# Patient Record
Sex: MALE | Race: WHITE | Employment: UNEMPLOYED | URBAN - METROPOLITAN AREA
[De-identification: names, ages, dates, MRNs, and addresses within clinical notes are randomized per-mention and may not be internally consistent; named-entity substitution may affect disease eponyms.]

---

## 2020-06-11 ENCOUNTER — VIRTUAL VISIT (OUTPATIENT)
Dept: FAMILY MEDICINE CLINIC | Age: 35
End: 2020-06-11

## 2020-06-11 DIAGNOSIS — G89.29 CHRONIC PAIN OF BOTH KNEES: Primary | ICD-10-CM

## 2020-06-11 DIAGNOSIS — M25.561 CHRONIC PAIN OF BOTH KNEES: Primary | ICD-10-CM

## 2020-06-11 DIAGNOSIS — M25.562 CHRONIC PAIN OF BOTH KNEES: Primary | ICD-10-CM

## 2020-06-11 NOTE — PROGRESS NOTES
Sherita Ahuja is a 36 y.o. male who was seen by synchronous (real-time) audio-video technology on 6/11/2020. Consent: Sherita Ahuja, who was seen by synchronous (real-time) audio-video technology, and/or his healthcare decision maker, is aware that this patient-initiated, Telehealth encounter on 6/11/2020 is a billable service, with coverage as determined by his insurance carrier. He is aware that he may receive a bill and has provided verbal consent to proceed: Yes. Assessment & Plan:   Diagnoses and all orders for this visit:    1. Chronic pain of both knees  -     REFERRAL TO ORTHOPEDICS  -     METABOLIC PANEL, COMPREHENSIVE; Future  -     URIC ACID; Future  -     CBC WITH AUTOMATED DIFF; Future  -     RHEUMATOID FACTOR, QT; Future        I spent at least 15 minutes on this visit with this new patient. 712  Subjective:   Sherita Ahuja is a 36 y.o. male who was seen for Leg Pain (he has chronic leg and foot pain.  knee worse when walking up and down stairs. he has had sxs for weeks. no assoc weakness or numbness.  )      Prior to Admission medications    Not on File     Allergies not on file    There is no problem list on file for this patient. No past medical history on file. Review of Systems   Constitutional: Negative for chills and fever. Respiratory: Negative for cough. Cardiovascular: Negative for chest pain. Musculoskeletal: Positive for back pain and joint pain. Skin: Negative for rash. Neurological: Negative. Psychiatric/Behavioral: Negative. Objective: There were no vitals taken for this visit.    General: alert, cooperative, no distress   Mental  status: normal mood, behavior, speech, dress, motor activity, and thought processes, able to follow commands   HENT: NCAT   Neck: no visualized mass   Resp: no respiratory distress   Neuro: no gross deficits   Skin: no discoloration or lesions of concern on visible areas   Psychiatric: normal affect, consistent with stated mood, no evidence of hallucinations     Additional exam findings: We discussed the expected course, resolution and complications of the diagnosis(es) in detail. Medication risks, benefits, costs, interactions, and alternatives were discussed as indicated. I advised him to contact the office if his condition worsens, changes or fails to improve as anticipated. He expressed understanding with the diagnosis(es) and plan. Davin Reza is a 36 y.o. male who was evaluated by a video visit encounter for concerns as above. Patient identification was verified prior to start of the visit. A caregiver was present when appropriate. Due to this being a TeleHealth encounter (During [facility]-32 public health emergency), evaluation of the following organ systems was limited: Vitals/Constitutional/EENT/Resp/CV/GI//MS/Neuro/Skin/Heme-Lymph-Imm. Pursuant to the emergency declaration under the [facility], [facility] waiver authority and the [employer] Act, this Virtual  Visit was conducted, with patient's (and/or legal guardian's) consent, to reduce the patient's risk of exposure to COVID-19 and provide necessary medical care. Services were provided through a video synchronous discussion virtually via doxy. me to substitute for in-person clinic visit. Patient was at home and provider was at the office.       Javi Uriarte MD

## 2020-06-12 ENCOUNTER — HOSPITAL ENCOUNTER (OUTPATIENT)
Dept: LAB | Age: 35
Discharge: HOME | End: 2020-06-12
Payer: COMMERCIAL

## 2020-06-12 DIAGNOSIS — M25.562 CHRONIC PAIN OF BOTH KNEES: ICD-10-CM

## 2020-06-12 DIAGNOSIS — M25.561 CHRONIC PAIN OF BOTH KNEES: ICD-10-CM

## 2020-06-12 DIAGNOSIS — G89.29 CHRONIC PAIN OF BOTH KNEES: ICD-10-CM

## 2020-06-12 LAB
ALBUMIN SERPL-MCNC: 3.9 G/DL (ref 3.4–5)
ALBUMIN/GLOB SERPL: 1.1 {RATIO} (ref 0.8–1.7)
ALP SERPL-CCNC: 121 U/L (ref 45–117)
ALT SERPL-CCNC: 30 U/L (ref 16–61)
ANION GAP SERPL CALC-SCNC: 5 MMOL/L (ref 3–18)
AST SERPL-CCNC: 13 U/L (ref 10–38)
BASOPHILS # BLD: 0 K/UL (ref 0–0.1)
BASOPHILS NFR BLD: 0 % (ref 0–2)
BILIRUB SERPL-MCNC: 0.5 MG/DL (ref 0.2–1)
BUN SERPL-MCNC: 16 MG/DL (ref 7–18)
BUN/CREAT SERPL: 15 (ref 12–20)
CALCIUM SERPL-MCNC: 9.2 MG/DL (ref 8.5–10.1)
CHLORIDE SERPL-SCNC: 107 MMOL/L (ref 100–111)
CO2 SERPL-SCNC: 27 MMOL/L (ref 21–32)
CREAT SERPL-MCNC: 1.09 MG/DL (ref 0.6–1.3)
DIFFERENTIAL METHOD BLD: ABNORMAL
EOSINOPHIL # BLD: 0.1 K/UL (ref 0–0.4)
EOSINOPHIL NFR BLD: 2 % (ref 0–5)
ERYTHROCYTE [DISTWIDTH] IN BLOOD BY AUTOMATED COUNT: 11.9 % (ref 11.6–14.5)
GLOBULIN SER CALC-MCNC: 3.7 G/DL (ref 2–4)
GLUCOSE SERPL-MCNC: 78 MG/DL (ref 74–99)
HCT VFR BLD AUTO: 45 % (ref 36–48)
HGB BLD-MCNC: 15.7 G/DL (ref 13–16)
LYMPHOCYTES # BLD: 1.8 K/UL (ref 0.9–3.6)
LYMPHOCYTES NFR BLD: 30 % (ref 21–52)
MCH RBC QN AUTO: 30.5 PG (ref 24–34)
MCHC RBC AUTO-ENTMCNC: 34.9 G/DL (ref 31–37)
MCV RBC AUTO: 87.5 FL (ref 74–97)
MONOCYTES # BLD: 0.6 K/UL (ref 0.05–1.2)
MONOCYTES NFR BLD: 11 % (ref 3–10)
NEUTS SEG # BLD: 3.5 K/UL (ref 1.8–8)
NEUTS SEG NFR BLD: 57 % (ref 40–73)
PLATELET # BLD AUTO: 236 K/UL (ref 135–420)
PMV BLD AUTO: 11.6 FL (ref 9.2–11.8)
POTASSIUM SERPL-SCNC: 4.3 MMOL/L (ref 3.5–5.5)
PROT SERPL-MCNC: 7.6 G/DL (ref 6.4–8.2)
RBC # BLD AUTO: 5.14 M/UL (ref 4.7–5.5)
RHEUMATOID FACT SERPL-ACNC: <10 IU/ML
SODIUM SERPL-SCNC: 139 MMOL/L (ref 136–145)
URATE SERPL-MCNC: 4.2 MG/DL (ref 2.6–7.2)
WBC # BLD AUTO: 6.1 K/UL (ref 4.6–13.2)

## 2020-06-12 PROCEDURE — 36415 COLL VENOUS BLD VENIPUNCTURE: CPT

## 2020-06-12 PROCEDURE — 80053 COMPREHEN METABOLIC PANEL: CPT

## 2020-06-12 PROCEDURE — 86431 RHEUMATOID FACTOR QUANT: CPT

## 2020-06-12 PROCEDURE — 85025 COMPLETE CBC W/AUTO DIFF WBC: CPT

## 2020-06-12 PROCEDURE — 84550 ASSAY OF BLOOD/URIC ACID: CPT

## 2020-07-08 ENCOUNTER — OFFICE VISIT (OUTPATIENT)
Dept: ORTHOPEDIC SURGERY | Age: 35
End: 2020-07-08

## 2020-07-08 VITALS
DIASTOLIC BLOOD PRESSURE: 85 MMHG | SYSTOLIC BLOOD PRESSURE: 125 MMHG | WEIGHT: 185 LBS | HEART RATE: 86 BPM | HEIGHT: 67 IN | TEMPERATURE: 98.2 F | BODY MASS INDEX: 29.03 KG/M2 | OXYGEN SATURATION: 98 %

## 2020-07-08 DIAGNOSIS — M79.671 FOOT PAIN, RIGHT: ICD-10-CM

## 2020-07-08 DIAGNOSIS — M76.51 PATELLAR TENDONITIS OF BOTH KNEES: ICD-10-CM

## 2020-07-08 DIAGNOSIS — G89.29 CHRONIC PAIN OF RIGHT ANKLE: ICD-10-CM

## 2020-07-08 DIAGNOSIS — M22.42 CHONDROMALACIA OF LEFT PATELLA: Primary | ICD-10-CM

## 2020-07-08 DIAGNOSIS — M25.571 CHRONIC PAIN OF RIGHT ANKLE: ICD-10-CM

## 2020-07-08 DIAGNOSIS — G89.29 CHRONIC PAIN OF RIGHT KNEE: ICD-10-CM

## 2020-07-08 DIAGNOSIS — M76.52 PATELLAR TENDONITIS OF BOTH KNEES: ICD-10-CM

## 2020-07-08 DIAGNOSIS — M25.561 CHRONIC PAIN OF RIGHT KNEE: ICD-10-CM

## 2020-07-08 DIAGNOSIS — M25.562 CHRONIC PAIN OF LEFT KNEE: ICD-10-CM

## 2020-07-08 DIAGNOSIS — G89.29 CHRONIC PAIN OF LEFT KNEE: ICD-10-CM

## 2020-07-08 DIAGNOSIS — M22.41 CHONDROMALACIA OF RIGHT PATELLA: ICD-10-CM

## 2020-07-08 DIAGNOSIS — M54.50 LUMBAR PAIN: ICD-10-CM

## 2020-07-08 DIAGNOSIS — M76.30 ILIOTIBIAL BAND TENDONITIS, UNSPECIFIED LATERALITY: ICD-10-CM

## 2020-07-08 NOTE — PROGRESS NOTES
Patient: Rosa Barragan                MRN: [id number]       SSN: [id number]  YOB: 1985        AGE: 36 y.o. SEX: male    PCP: Susan Ford MD  07/08/20    Chief Complaint   Patient presents with    Ankle Pain     RIGHT    Knee Pain     BILAT     HISTORY:  Rosa Barragan is a 36 y.o. male who is seen for bilateral knee and right ankle pain. He has been experiencing bilateral knee pain  L>R for the past several months. He relates his knee pain to gait alteration--compensating for his right foot arthritis pain. He experiences pain over Gerdy's tubercle region both knees. He notes pain with standing, walking and stair climbing. He experiences startup pain after sitting. He is unable to kneel on hard surfaces. He is also seen for right foot pain. 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 He reports severe right ankle stiffness. He is also seen for back pain. He fell 2 years ago and has been experiencing back pain ever since. 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 He lives in [place] with his wife and 4 sons- 6, 6, 10 and 3 yo. He was medically disqualified from joining the [employer] years ago due to his right foot problem. He recently bought his wife knee pads because she broke both of her heels. Mr. Antonio Justin weighs 185 lbs and is 5'7\" tall.       No results found for: HBA1C, HGBE8, IGV3PXUI, MOW9NVGC, ECK1JNCB  Weight Metrics 7/8/2020   Weight 185 lb   BMI 28.98 kg/m2       There is no problem list on file for this patient. REVIEW OF SYSTEMS:    Constitutional Symptoms: Negative   Eyes: Negative   Ears, Nose, Throat and Mouth: Negative   Cardiovascular: Negative   Respiratory: Negative   Genitourinary: Per HPI   Gastrointestinal: Per HPI   Integumentary (Skin and/or Breast): Negative   Musculoskeletal: Per HPI   Endocrine/Rheumatologic: Negative   Neurological: Per HPI   Hematology/Lymphatic: Negative    Allergic/Immunologic: Negative   Phychiatric: Negative    Social History     Socioeconomic History    Marital status:      Spouse name: Not on file    Number of children: Not on file    Years of education: Not on file    Highest education level: Not on file   Occupational History    Not on file   Social Needs    Financial resource strain: Not on file    Food insecurity     Worry: Not on file     Inability: Not on file    Transportation needs     Medical: Not on file     Non-medical: Not on file   Tobacco Use    Smoking status: Light Tobacco Smoker    Smokeless tobacco: Never Used   Substance and Sexual Activity    Alcohol use: Not on file    Drug use: Not on file    Sexual activity: Not on file   Lifestyle    Physical activity     Days per week: Not on file     Minutes per session: Not on file    Stress: Not on file   Relationships    Social connections     Talks on phone: Not on file     Gets together: Not on file     Attends [employer] service: Not on file     Active member of club or organization: Not on file     Attends meetings of clubs or organizations: Not on file     Relationship status: Not on file    Intimate partner violence     Fear of current or ex partner: Not on file     Emotionally abused: Not on file     Physically abused: Not on file     Forced sexual activity: Not on file   Other Topics Concern    Not on file   Social History Narrative    Not on file      Allergies   Allergen Reactions    Amoxicillin Hives      No current outpatient medications on file.      No current facility-administered medications for this visit. PHYSICAL EXAMINATION:  Visit Vitals  /85   Pulse 86   Temp 98.2 °F (36.8 °C) (Temporal)   Ht 5' 7\" (1.702 m)   Wt 185 lb (83.9 kg)   SpO2 98%   BMI 28.98 kg/m²      ORTHO EXAMINATION:  Examination Right knee Left knee   Skin Intact Intact   Range of motion 120-0 120-0   Effusion - -   Medial joint line tenderness + +   Lateral joint line tenderness - -   Popliteal tenderness - -   Osteophytes palpable - -   Valentíns - -   Patella crepitus - -   Anterior drawer - -   Lateral laxity - -   Medial laxity - -   Varus deformity - -   Valgus deformity - -   Pretibial edema - -   Calf tenderness - -     Examination Right Ankle/Foot Left Ankle/Foot   Skin Intact, medial incision site Intact   Swelling - -   Dorsiflexion 0 10   Plantarflexion 20 45   Deformity - -   Inversion laxity - -   Anterior drawer - -   Medial tenderness - -   Lateral tenderness - -   Heel cord Intact Intact   Sensation Intact Intact   Bunion - -   Toe nails Normal Normal   Capillary refill Normal Normal    Walking with limp    RADIOGRAPHS:  XR BILAT KNEE 7/8/20 [facility]  IMPRESSION:  Three views - No fractures, no effusion, mild joint space narrowing, no osteophytes present. Kellgren Topher grade 1     XR LEFT ANKLE 7/8/20 [facility]  IMPRESSION:  Three views - No fractures, mild subtalar, tibiotalar osteoarthritis. IMPRESSION:      ICD-10-CM ICD-9-CM    1. Chondromalacia of left patella M22.42 717.7    2. Chronic pain of right knee M25.561 719.46 AMB POC X-RAY KNEE 3 VIEW    G89.29 338.29    3. Chronic pain of left knee M25.562 719.46 AMB POC X-RAY KNEE 3 VIEW    G89.29 338.29    4. Foot pain, right M79.671 729.5 AMB POC XRAY, FOOT; COMPLETE, 3+ VIEW   5. Chronic pain of right ankle M25.571 719.47 POC XRAY, ANKLE; 2 VIEWS    G89.29 338.29    6. Chondromalacia of right patella M22.41 717.7    7. Iliotibial band tendonitis, unspecified laterality M76.30 728.89    8.  Patellar tendonitis of both knees M76.51 726.64     M76.52     9. Lumbar pain M54.5 724.2 REFERRAL TO SPINE SURGERY     PLAN:  Begin knee exercises. OTC analgesics for pain. There is no need for knee surgery at this time. He will follow up PRN with Dr. Guevara Terry for ortho foot/ankle consultation. He will also follow up at the spine center.       Scribed by Isabel Cote (Bibi Castro) as dictated by Saranya Edmond MD

## 2020-07-08 NOTE — PATIENT INSTRUCTIONS
Patellar Tendinitis (Jumper's Knee): Exercises Introduction Here are some examples of exercises for you to try. The exercises may be suggested for a condition or for rehabilitation. Start each exercise slowly. Ease off the exercises if you start to have pain. You will be told when to start these exercises and which ones will work best for you. How to do the exercises Straight-leg raises to the front 1. Lie on your back with your good knee bent so that your foot rests flat on the floor. Your affected leg should be straight. Make sure that your low back has a normal curve. You should be able to slip your hand in between the floor and the small of your back, with your palm touching the floor and your back touching the back of your hand. 2. Tighten the thigh muscles in your affected leg by pressing the back of your knee flat down to the floor. Hold your knee straight. 3. Keeping the thigh muscles tight and your leg straight, lift your leg up so that your heel is about 12 inches off the floor. 4. Hold for about 6 seconds, then lower your leg slowly. Rest for up to 10 seconds between repetitions. 5. Repeat 8 to 12 times. Short-arc quad 1. Lie on your back with your knees bent over a foam roll or large rolled-up towel and your heels on the floor. 2. Lift the lower part of your affected leg until your leg is straight. Keep the back of your knee on the foam roll or towel. 3. Hold your leg straight for about 6 seconds, then slowly bend your knee and lower your heel back to the floor. Rest for up to 10 seconds between repetitions. 4. Repeat 8 to 12 times. Half-squat with knees and feet turned out to the side 1. Stand with your feet about shoulder-width apart and turned out to the side about 45 degrees. 2. Keep your back straight, and tighten your buttocks.  
3. Slowly bend your knees to lower your body about one-quarter of the way down toward the floor. Try to keep your back straight at all times, and do not let your pelvis tilt forward or your knees extend beyond the tip of your toes. 4. Repeat 8 to 12 times. Step up 1. Place a single-step footstool on the floor. If you do not have a footstool, you can use a thick book, such as a phone book, a dictionary, or an encyclopedia. If you are not steady on your feet, hold on to a chair, counter, or wall while you do this exercise. 2. Keeping your back straight, step up with your affected leg. Try not to push off your back leg as you step up. Only use your affected leg to bring you up on to the step. Then lift your other leg on to the step. As you step up, try to keep your knee moving in a straight line with your middle toe. 3. Move back to the starting position, with both feet on the floor. 4. Repeat 8 to 12 times. Step down 1. Stand on a single-step footstool. If you do not have a footstool, you can use a thick book, such as a phone book, a dictionary, or an encyclopedia. If you are not steady on your feet, hold on to a chair, counter, or wall while you do this exercise. 2. Slowly step down with your good leg, allowing your heel to lightly touch the floor. As you step down, try to keep your affected knee moving in a straight line toward your middle toe. 3. Move back to the starting position, with both feet on the footstool or book. 4. Repeat 8 to 12 times. Terminal knee extension 1. Tie the ends of an exercise band together to form a loop. Attach one end of the loop to a secure object, or shut a door on it to hold it in place. (Or you can have someone hold one end of the loop to provide resistance.) 2. Loop the other end of the exercise band around the knee of your affected leg. Keep that leg somewhat bent at the knee. 3. Put your good leg about a step behind your affected leg. Then slowly straighten your affected leg by tightening the thigh muscles of that leg. 4. Hold for about 6 seconds, then return to the starting position, with your knee somewhat bent. 5. Rest for up to 10 seconds. 6. Repeat 8 to 12 times. Follow-up care is a key part of your treatment and safety. Be sure to make and go to all appointments, and call your doctor if you are having problems. It's also a good idea to know your test results and keep a list of the medicines you take. Where can you learn more? Go to http://ruth-deshawn.info/ Enter G839 in the search box to learn more about \"Patellar Tendinitis (Jumper's Knee): Exercises. \" Current as of: March 2, 2020               Content Version: 12.5 © 2006-2020 [employer]. Care instructions adapted under license by [employer] (which disclaims liability or warranty for this information). If you have questions about a medical condition or this instruction, always ask your healthcare professional. [place] [address] any warranty or liability for your use of this information. Knee: Exercises Introduction Here are some examples of exercises for you to try. The exercises may be suggested for a condition or for rehabilitation. Start each exercise slowly. Ease off the exercises if you start to have pain. You will be told when to start these exercises and which ones will work best for you. How to do the exercises [facility] 1. Sit with your leg straight and supported on the floor or a firm bed. (If you feel discomfort in the front or back of your knee, place a small towel roll under your knee.) 2. Tighten the muscles on top of your thigh by pressing the back of your knee flat down to the floor. (If you feel discomfort under your kneecap, place a small towel roll under your knee.) 3. Hold for about 6 seconds, then rest for up to 10 seconds. 4. Do 8 to 12 repetitions several times a day. Straight-leg raises to the front 1. Lie on your back with your good knee bent so that your foot rests flat on the floor. Your injured leg should be straight. Make sure that your low back has a normal curve. You should be able to slip your flat hand in between the floor and the small of your back, with your palm touching the floor and your back touching the back of your hand. 2. Tighten the thigh muscles in the injured leg by pressing the back of your knee flat down to the floor. Hold your knee straight. 3. Keeping the thigh muscles tight, lift your injured leg up so that your heel is about 12 inches off the floor. Hold for about 6 seconds and then lower slowly. 4. Do 8 to 12 repetitions, 3 times a day. Straight-leg raises to the outside 1. Lie on your side, with your injured leg on top. 2. Tighten the front thigh muscles of your injured leg to keep your knee straight. 3. Keep your hip and your leg straight in line with the rest of your body, and keep your knee pointing forward. Do not drop your hip back. 4. Lift your injured leg straight up toward the ceiling, about 12 inches off the floor. Hold for about 6 seconds, then slowly lower your leg. 5. Do 8 to 12 repetitions. Straight-leg raises to the back 1. Lie on your stomach, and lift your leg straight up behind you (toward the ceiling). 2. Lift your toes about 6 inches off the floor, hold for about 6 seconds, then lower slowly. 3. Do 8 to 12 repetitions. Straight-leg raises to the inside 1. Lie on the side of your body with the injured leg. 2. You can either prop your other (good) leg up on a chair, or you can bend your good knee and put that foot in front of your injured knee. Do not drop your hip back. 3. Tighten the muscles on the front of your thigh to straighten your injured knee. 4. Keep your kneecap pointing forward, and lift your whole leg up toward the ceiling about 6 inches. Hold for about 6 seconds, then lower slowly. 5. Do 8 to 12 repetitions. Heel dig bridging 1. Lie on your back with both knees bent and your ankles bent so that only your heels are digging into the floor. Your knees should be bent about 90 degrees. 2. Then push your heels into the floor, squeeze your buttocks, and lift your hips off the floor until your shoulders, hips, and knees are all in a straight line. 3. Hold for about 6 seconds as you continue to breathe normally, and then slowly lower your hips back down to the floor and rest for up to 10 seconds. 4. Do 8 to 12 repetitions. Hamstring curls 1. Lie on your stomach with your knees straight. If your kneecap is uncomfortable, roll up a washcloth and put it under your leg just above your kneecap. 2. Lift the foot of your injured leg by bending the knee so that you bring the foot up toward your buttock. If this motion hurts, try it without bending your knee quite as far. This may help you avoid any painful motion. 3. Slowly lower your leg back to the floor. 4. Do 8 to 12 repetitions. 5. With permission from your doctor or physical therapist, you may also want to add a cuff weight to your ankle (not more than 5 pounds). With weight, you do not have to lift your leg more than 12 inches to get a hamstring workout. Shallow standing knee bends Do this exercise only if you have very little pain; if you have no clicking, locking, or giving way if you have an injured knee; and if it does not hurt while you are doing 8 to 12 repetitions. 1. Stand with your hands lightly resting on a counter or chair in front of you. Put your feet shoulder-width apart. 2. Slowly bend your knees so that you squat down like you are going to sit in a chair. Make sure your knees do not go in front of your toes. 3. Lower yourself about 6 inches. Your heels should remain on the floor at all times. 4. Rise slowly to a standing position. Heel raises 1.  Stand with your feet a few inches apart, with your hands lightly resting on a counter or chair in front of you. 2. Slowly raise your heels off the floor while keeping your knees straight. 3. Hold for about 6 seconds, then slowly lower your heels to the floor. 4. Do 8 to 12 repetitions several times during the day. Follow-up care is a key part of your treatment and safety. Be sure to make and go to all appointments, and call your doctor if you are having problems. It's also a good idea to know your test results and keep a list of the medicines you take. Where can you learn more? Go to http://ruth-deshawn.info/ Enter Z806 in the search box to learn more about \"Knee: Exercises. \" Current as of: March 2, 2020               Content Version: 12.5 © 2006-2020 [employer]. Care instructions adapted under license by [employer] (which disclaims liability or warranty for this information). If you have questions about a medical condition or this instruction, always ask your healthcare professional. [address] any warranty or liability for your use of this information.

## 2020-07-14 ENCOUNTER — OFFICE VISIT (OUTPATIENT)
Dept: ORTHOPEDIC SURGERY | Age: 35
End: 2020-07-14

## 2020-07-14 VITALS
WEIGHT: 185 LBS | TEMPERATURE: 97.7 F | DIASTOLIC BLOOD PRESSURE: 88 MMHG | BODY MASS INDEX: 29.03 KG/M2 | HEIGHT: 67 IN | HEART RATE: 73 BPM | SYSTOLIC BLOOD PRESSURE: 125 MMHG | OXYGEN SATURATION: 97 %

## 2020-07-14 DIAGNOSIS — G89.29 CHRONIC PAIN OF RIGHT ANKLE: ICD-10-CM

## 2020-07-14 DIAGNOSIS — M19.271 OTHER SECONDARY OSTEOARTHRITIS OF RIGHT ANKLE: Primary | ICD-10-CM

## 2020-07-14 DIAGNOSIS — M25.571 CHRONIC PAIN OF RIGHT ANKLE: ICD-10-CM

## 2020-07-14 DIAGNOSIS — M79.671 FOOT PAIN, RIGHT: ICD-10-CM

## 2020-07-14 DIAGNOSIS — M19.271 OTHER SECONDARY OSTEOARTHRITIS OF RIGHT FOOT: ICD-10-CM

## 2020-07-14 RX ORDER — DICLOFENAC SODIUM 75 MG/1
TABLET, DELAYED RELEASE ORAL
Qty: 60 TAB | Refills: 1 | Status: SHIPPED | OUTPATIENT
Start: 2020-07-14 | End: 2020-08-17

## 2020-07-14 RX ORDER — MELOXICAM 15 MG/1
15 TABLET ORAL DAILY
Qty: 30 TAB | Refills: 0 | Status: CANCELLED | OUTPATIENT
Start: 2020-07-14 | End: 2020-08-13

## 2020-07-14 RX ORDER — FAMOTIDINE 40 MG/1
40 TABLET, FILM COATED ORAL DAILY
Qty: 30 TAB | Refills: 0 | Status: SHIPPED | OUTPATIENT
Start: 2020-07-14

## 2020-07-14 NOTE — PROGRESS NOTES
AMBULATORY PROGRESS NOTE      Patient: Skylar Alejandre             MRN: [id number]     SSN: [id number] Body mass index is 28.98 kg/m². YOB: 1985     AGE: 36 y.o. EX: male    PCP: Fredi Birmingham MD       IMPRESSION //  DIAGNOSIS AND TREATMENT PLAN      DIAGNOSES  1. Other secondary osteoarthritis of right ankle    2. Other secondary osteoarthritis of right foot    3. Foot pain, right    4. Chronic pain of right ankle        Orders Placed This Encounter    Generic Supply Order     custom hinged AFO offload lateral hindfoot and protect the ankle    AMB POC XRAY, FOOT; COMPLETE, 3+ VIEW     ASK ALL FEMALE PATIENTS IF PREGNANT     Order Specific Question:   Reason for Exam     Answer:   PAIN    POC XRAY, ANKLE; 2 VIEWS     Order Specific Question:   Reason for Exam     Answer:   pain    famotidine (PEPCID) 40 mg tablet     Sig: Take 1 Tab by mouth daily. Dispense:  30 Tab     Refill:  0    diclofenac EC (VOLTAREN) 75 mg EC tablet     Sig: Take BID     Dispense:  60 Tab     Refill:  1        I had a lengthy discussion with him. He has severely severe arthritis of the right ankle, and has a congenital deformity, to the right ankle, having had several surgeries for clubfoot deformity. THE weightbearing x-rays of his ankle today, 3 views, show severe osteophytic changes across the right ankle as well as a diminutive right talus. A diminutive sized calcaneus. The subtalar joint appears to be fairly observed, the ankle joints and the talonavicular and calcaneocuboid joints have some arthritic changes. I see no subluxation or dislocation, on his ankle x-rays. The foot x-rays, AP lateral beak, weightbearing, show some degenerative changes across #123 TMT region, mild, on his right side, but again degenerative changes, seen across the talonavicular as well. Next having discussed his x-rays with him having examined him, the options are the following:     The nonsurgical treatment is to be custom orthotic offload lateral column foot, or a hinged type AFO brace that he can wear at in his work boots. Surgical options would be quite difficult he has a diminutive talus he required pantalar arthrodesis certainly a weightbearing CT scan with 3D reconstruction, would be most helpful prior to any surgical reconstruction, so we can size of the the talus. In size the bony anatomy. He has minimal motion of the ankle but is not interested in any type of major reconstructive procedure this current time. We will push hard for conservative measures, for him which would consist of custom brace to have him see Marianela Ha at the [facility]. PLAN:    1. DME order: Custom hinged AFO offload lateral hindfoot and protect the ankle. 2. Diclofenac 75 mg: BID as needed, 60 tablets, 1 refills. 3. Pepcid 40 m PO every day; 30 tablets, 0 refills. 4. Anticipate ankle fusion if condition does not improve with conservative treatment. RTO- 5 weeks    Fili Cartagena MD has ordered a custom brace or DME product for Lluvia Castano . This will be customized and made for you by an outside facility. I am requesting that you contact the Orthotist company provided below in order to have the prescription made.  PROSTHETICS AND ORTHOTICS      Lluvia Castano is in need of CUSTOM   Right     1. Unilateral custom hinged AFO, as well as a possible custom laterally posted trilaminar orthotic, appears laterally posted orthotic he wears regular tennis shoes but this will offload lateral column of his foot. The hinged AFO, would give him the most support, while he is at work. 2. GOAL OF TREATMENT TO: to provide M/L stability, optimal arch support, and limit ML/AP motion.      Lluvia Castano needs tri planar control (Medial / Lateral stability, optimal arch support, and limited Medial/Lateral // Anterior/Posterior Motion) of the foot and ankle in order to improve anatomical alignment, protect/control motion, and to relieve pain. [address] has tried other orthopaedic devices (over-the-counter orthotics, custom orthotics in the past, cam walker boot in the past) and each of these has been either ill fitting, poorly tolerated, provided incomplete support, provided insufficient  pain relief. Please look favorably upon Donnal Sleek and please assist in covering the financial expense of the custom DME. Harriet Rivera MD  7/14/2020  12:07 PM             \[facility]\"" //  [address] 36 y.o. male who presents to my outpatient office for evaluation of: a right club foot. He experiences pain with stiffness and limited range of motion. 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 At work he wears high top boots by the brand [employer]. He experiences start up pain in the morning, which forces him to walk on the tips of his toes. His pain can progress throughout the day if he is very active. He sometimes experiences significant swelling with a locking sensation of his right ankle. He has a history of club foot as a young child and has had multiple surgeries at [facility]. He has previously worn a cast, custom shoes and a dorsiflexion brace that he only wore at night when he was 7years old. He now treats with ibuprofen prn, and was previously prescribed Mobic with some relief. 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      ANKLE/FOOT right    Visit Vitals  /88   Pulse 73   Temp 97.7 °F (36.5 °C) (Temporal)   Ht 5' 7\" (1.702 m)   Wt 185 lb (83.9 kg)   SpO2 97%   BMI 28.98 kg/m²        Psychiatry: Alert, Oriented x 3; Speech normal in context and clarity,            Memory intact grossly, no involuntary movements - tremors, no dementia  Gait: normal  Tenderness: He has some mild tenderness, the anterior ankle joint, Cutaneous: He is well-healed medial lateral surgical scars about his right ankle   Joint Motion:   He has poor motion at the ankle, hindfoot ST joint, CC and NC joints are poor motion. He has near normal range of motion, the great toe and the lesser digits at the MTP joints. Joint / Tendon Stability: No Ankle or Subtalar instability or joint laxity. No peroneal sublux ability or dislocation  Alignment: Forefoot, Midfoot, Hindfoot WNL. Integumentary: Normal  Contractures: Yes to the Achilles, but his ankle can be brought in towards neutral, but he does have a block to motion, due to the ankle and abnormal ankle architecture causes him to have incomplete range of motion of the ankle joint   ROM: Poor ankle and hindfoot motion  Neuro Motor/Sensory: NL/NL, Vascular: NL foot/ankle pulses  Lymphatics: No extremity lymphedema, No calf swelling, no tenderness to calf muscles. His right calf and right foot are of smaller size, compared to the left, which is classic for and typical for patients with a clubfoot deformities. Or residual fractional clubfoot deformities. CHART REVIEW     There is no problem list on file for this patient. Gema Loyola has been experiencing pain and discomfort confirmed as outlined in the pain assessment outlined below. Pain Assessment  7/14/2020   Location of Pain Ankle   Location Modifiers Right   Severity of Pain 2   Quality of Pain Sharp   Quality of Pain Comment -   Frequency of Pain Intermittent   Aggravating Factors Walking;Standing   Limiting Behavior Yes   Relieving Factors Nothing   Result of Injury No        Gema Loyola  has no past medical history on file. Patients is employed at:         History reviewed. No pertinent past medical history. History reviewed. No pertinent surgical history. Current Outpatient Medications   Medication Sig    famotidine (PEPCID) 40 mg tablet Take 1 Tab by mouth daily.     diclofenac EC (VOLTAREN) 75 mg EC tablet Take BID     No current facility-administered medications for this visit. Allergies   Allergen Reactions    Amoxicillin Hives     Social History     Occupational History    Not on file   Tobacco Use    Smoking status: Light Tobacco Smoker    Smokeless tobacco: Never Used   Substance and Sexual Activity    Alcohol use: Not on file    Drug use: Not on file    Sexual activity: Not on file     History reviewed. No pertinent family history. THE  Nicola Leal MD 7/14/2020 . DIAGNOSTIC IMAGING  LAB DATA      No results found for: HBA1C, HGBE8, EDV0EKLL, HQN2BFHY //   Lab Results   Component Value Date/Time    Glucose 78 06/12/2020 10:29 AM        No results found for: XKJ4SZKH, ZLE1AOMO      No results found for: VITD3, XQVID2, XQVID3, XQVID, VD3RIA, AUCH37OSNZS      REVIEW OF SYSTEMS : 7/14/2020  ALL BELOW ARE Negative except : SEE HPI     CONSTITUTIONAL: No weight loss. PSYCHOLOGICAL : No Feelings of anxiety, depression, agitation  EYES: No blurred vision and no eye discharge. NO eye pain, double vision  ENT: No nasal discharge. No ear pain. CARDIOVASCULAR: No chest pain and no diaphoresis. RESPIRATORY: No cough, no hemoptysis. GI: No vomiting, no diarrhea   : No urinary frequency and no dysuria. MUSCULOSKELETAL: see HPI  SKIN: No rashes. NEURO:  No dizziness,weakness, headaches// No visual changes or confusion, or seizures,   ENDOCRINE: No polyphagia and no polydipsia. HEMATOLOGY: No bleeding tendencies. DIAGNOSTIC IMAGING      The weightbearing x-rays of his ankle today, 3 views, show severe osteophytic changes across the right ankle as well as a diminutive right talus. A diminutive sized calcaneus. The subtalar joint appears to be fairly observed, the ankle joints and the talonavicular and calcaneocuboid joints have some arthritic changes. I see no subluxation or dislocation, on his ankle x-rays.       The foot x-rays, AP lateral beak, weightbearing, show some degenerative changes across #123 TMT region, mild, on his right side, but again degenerative changes, seen across the talonavicular as well. Please see above section of this report. I have personally reviewed the results of the above study. The interpretation of this study is my professional opinion.       Braulio Montano MD  7/14/2020  7:24 AM

## 2020-07-14 NOTE — PATIENT INSTRUCTIONS
You have been provided with an order for durable medical equipment that you may  at an outside facility as our office does not carry the equipment you need. You may pick it up at any medical supply company you like. Listed below are a few different locations for your convenience:      [facility]  Phone: [phone]  [place]:   [address] [place] [address], [address] [place] [address],[address]  [place], [address] [place]/Iman [place]:  Óscar Hester [address]  [place]:  [facility],[address].    [place], [address]    DME order: custom hinged AFO offload lateral hindfoot and protect the ankle

## 2020-08-12 NOTE — PROGRESS NOTES
[facility]  [address], [place]   Phone: [phone]  Fax: [phone]        INITIAL CONSULTATION      HISTORY OF PRESENT ILLNESS:  Levar Castro is a 36 y.o. male whom is referred from Dr. Aguila Mast secondary to intermittent low back pain x 5 years without specific trauma. He rates his pain 2/10. He denies radicular symptoms. His pain is exacerbated by standing and relieved with laying. He has treated with Ibuprofen and Flexeril with benefit. He has chiropractic treatment x 2 years ago with benefit. Patient denies previous spinal surgery, injections, or physical therapy. Pt denies change in bowel or bladder habits. Pt denies fever, weight loss, or skin changes. Note from Dr. Aguila Mast dated 7/8/2020 indicating patient was seen with c/o bilateral knee and ankle pain x several months. Pt had surgery for club foot on the right. He had achilles tendon lengthening as an infant. Additionally he endorses back pain x 2 years after a fall. Referred to spine. Note from Dr. Yosvany Dennis dated 7/14/2020 indicating patient was seen with c/o chronic right ankle pain.  reviewed. Body mass index is 28.76 kg/m². PCP: Ramona Armenta MD    History reviewed. No pertinent past medical history. History reviewed. No pertinent surgical history. Social History     Tobacco Use    Smoking status: Light Tobacco Smoker    Smokeless tobacco: Never Used   Substance Use Topics    Alcohol use: Not on file       Work status: The patient is employed. Marital status: . Current Outpatient Medications   Medication Sig Dispense Refill    famotidine (PEPCID) 40 mg tablet Take 1 Tab by mouth daily. 30 Tab 0    diclofenac EC (VOLTAREN) 75 mg EC tablet Take BID 60 Tab 1       Allergies   Allergen Reactions    Amoxicillin Hives          History reviewed. No pertinent family history.       REVIEW OF SYSTEMS  Constitutional symptoms: Negative  Eyes: Negative  Ears, Nose, Throat, and Mouth: Negative  Cardiovascular: Negative  Respiratory: Negative  Genitourinary: Negative  Integumentary (Skin and/or breast): Negative  Musculoskeletal: Positive for low back pain. Extremities: Negative for edema. Endocrine/Rheumatologic: Negative  Hematologic/Lymphatic: Negative  Allergic/Immunologic: Negative  Psychiatric: Negative       PHYSICAL EXAMINATION  Visit Vitals  /86 (BP 1 Location: Left arm, BP Patient Position: Sitting)   Pulse 76   Temp 98 °F (36.7 °C)   Ht 5' 7\" (1.702 m)   Wt 183 lb 9.6 oz (83.3 kg)   SpO2 99%   BMI 28.76 kg/m²       CONSTITUTIONAL: NAD, A&O x 3  HEART: Regular rate and rhythm  GASTROINTESTINAL: Positive bowel sounds, soft, nontender, and nondistended  LUNGS: Clear to auscultation bilaterally. SKIN: Negative for rash. RANGE OF MOTION: The patient has full passive range of motion in all four extremities. Decreased ROM right ankle DF and PF from previous surgery. SENSATION: Sensation is intact to light touch throughout. MOTOR:   Straight Leg Raise: Negative, bilateral  Xie: Negative, bilateral  Deep tendon reflexes are 1 at the biceps, trace at the triceps, and 1 at the brachioradialis bilaterally. Deep tendon reflexes are 2 at the knees bilaterally and 1 on the RLE 2 on the LLE at the ankles. Shoulder AB/Flex Elbow Flex Wrist Ext Elbow Ext Wrist Flex Hand Intrin Tone   Right +4/5 +4/5 +4/5 +4/5 +4/5 +4/5 +4/5   Left +4/5 +4/5 +4/5 +4/5 +4/5 +4/5 +4/5              Hip Flex Knee Ext Knee Flex Ankle DF GTE Ankle PF Tone   Right +4/5 +4/5 +4/5 +4/5  decreased ROM   +4/5 +4/5  decreased ROM +4/5   Left +4/5 +4/5 +4/5 +4/5 +4/5 +4/5 +4/5     RADIOGRAPHS  Preliminary reading of L spine plain films dated 8/14/2020 revealed:  Rather than scoliosis, I suspect he has a leg length discrepancy. Left iliac crest appears to be elevated compared to the right. No acute pathology identified.      These are being sent out for official reading by Dr. Shelvia Leventhal Naif Escobar. ASSESSMENT   Diagnoses and all orders for this visit:    1. Low back pain at multiple sites  -     AMB POC XRAY, SPINE, LUMBOSACRAL; 2 O    2. Lumbar pain    3. Strain of lumbar region, initial encounter         IMPRESSIONS/RECOMMENDATIONS:  Patient presents today with c/o low back pain. Multiple treatment options were discussed. I will refer him to physical therapy with an emphasis on HEP and have them measure for a leg length discrepancy. Patient is neurologically intact. I will see the patient back in 2 month's time or earlier if needed. Written by Venson Simmonds, as dictated by Mary Jean MD  I examined the patient, reviewed and agree with the note.

## 2020-08-14 ENCOUNTER — OFFICE VISIT (OUTPATIENT)
Dept: ORTHOPEDIC SURGERY | Age: 35
End: 2020-08-14

## 2020-08-14 VITALS
HEIGHT: 67 IN | BODY MASS INDEX: 28.82 KG/M2 | DIASTOLIC BLOOD PRESSURE: 86 MMHG | HEART RATE: 76 BPM | OXYGEN SATURATION: 99 % | WEIGHT: 183.6 LBS | TEMPERATURE: 98 F | SYSTOLIC BLOOD PRESSURE: 119 MMHG

## 2020-08-14 DIAGNOSIS — S39.012A STRAIN OF LUMBAR REGION, INITIAL ENCOUNTER: ICD-10-CM

## 2020-08-14 DIAGNOSIS — M54.50 LUMBAR PAIN: ICD-10-CM

## 2020-08-14 DIAGNOSIS — M54.50 LOW BACK PAIN AT MULTIPLE SITES: Primary | ICD-10-CM

## 2020-08-14 NOTE — LETTER
8/14/20 Patient: Bessy Barba YOB: 1985 Date of Visit: 8/14/2020 Paula Zelaya MD 
[address] [facility] [address] VIA In Basket Milan Bush MD 
[id number] [facility] 
[address] [facility] [address] VIA In Basket Dear MD Milan Roy MD, Thank you for referring Mr. Selwyn Fair to [address] for evaluation. My notes for this consultation are attached. If you have questions, please do not hesitate to call me. I look forward to following your patient along with you. Sincerely, Uriel Alvarez MD

## 2020-08-17 ENCOUNTER — OFFICE VISIT (OUTPATIENT)
Dept: ORTHOPEDIC SURGERY | Age: 35
End: 2020-08-17

## 2020-08-17 VITALS
HEART RATE: 74 BPM | SYSTOLIC BLOOD PRESSURE: 114 MMHG | DIASTOLIC BLOOD PRESSURE: 81 MMHG | TEMPERATURE: 97.5 F | WEIGHT: 183 LBS | BODY MASS INDEX: 28.72 KG/M2 | HEIGHT: 67 IN | OXYGEN SATURATION: 100 %

## 2020-08-17 DIAGNOSIS — M19.271 OTHER SECONDARY OSTEOARTHRITIS OF RIGHT FOOT: ICD-10-CM

## 2020-08-17 DIAGNOSIS — M19.271 OTHER SECONDARY OSTEOARTHRITIS OF RIGHT ANKLE: Primary | ICD-10-CM

## 2020-08-17 RX ORDER — FAMOTIDINE 40 MG/1
40 TABLET, FILM COATED ORAL DAILY
Qty: 30 TAB | Refills: 0 | Status: CANCELLED | OUTPATIENT
Start: 2020-08-17

## 2020-08-17 RX ORDER — MELOXICAM 15 MG/1
15 TABLET ORAL
Qty: 30 TAB | Refills: 0 | Status: SHIPPED | OUTPATIENT
Start: 2020-08-17

## 2020-08-17 NOTE — PROGRESS NOTES
AMBULATORY PROGRESS NOTE      Patient: Gene Ram             MRN: [id number]     SSN: [id number] Body mass index is 28.66 kg/m². YOB: 1985     AGE: 36 y.o. EX: male    PCP: Pavan Pringle MD       IMPRESSION //  DIAGNOSIS AND TREATMENT PLAN      DIAGNOSES  1. Other secondary osteoarthritis of right ankle    2. Other secondary osteoarthritis of right foot        Orders Placed This Encounter    meloxicam (Mobic) 15 mg tablet     Sig: Take 1 Tab by mouth daily (with breakfast). Dispense:  30 Tab     Refill:  0          PLAN:      Plan is to see him in 6 weeks. He saw Dr. Cindy Urban, back specialist, and was referred to physical therapy. Patient mentions having a leg length discrepancy, which is understandable, having had multiple surgeries in his right leg. He may require an adjustment, and the AFO brace that was made for him to increase the height to equalize his leg length. He does not of the brace with him today. Recommendations continued AFO,    Get him into a physical therapy as prescribed by our spine division of orthopedics, reassess him in 6 weeks. Stop the diclofenac, put him on Mobic 15 mg 1 p.o. each day. HPI //  [address] 36 y.o. male who presents to my outpatient office for evaluation of:    Since I saw him last he has a AFO brace that helps him still longer walk longer function better. He has no discomfort when he wears the brace. However when he gets home takes the brace off, then he has discomfort, the ankle. It is recalled he is in multiple surgeries on this right ankle, for clubfoot deformities. No surgical invention, is recommended form of this current time. He is heading out of town, for work, and [employer].       Visit Vitals  /81   Pulse 74   Temp 97.5 °F (36.4 °C)   Ht 5' 7\" (1.702 m)   Wt 183 lb (83 kg)   SpO2 100%   BMI 28.66 kg/m²       Psychiatry: Alert, Oriented x 3; Speech normal in context and clarity,                       Memory intact grossly, no involuntary movements - tremors, no dementia  Gait: normal  Tenderness: He has some mild tenderness, the anterior ankle joint, Cutaneous: He is well-healed medial lateral surgical scars about his right ankle              Joint Motion:   He has poor motion at the ankle, hindfoot ST joint, CC and NC joints are poor motion. He has near normal range of motion, the great toe and the lesser digits at the MTP joints. Joint / Tendon Stability: No Ankle or Subtalar instability or joint laxity. No peroneal sublux ability or dislocation  Alignment: Forefoot, Midfoot, Hindfoot WNL. Integumentary: Normal  Contractures: Yes to the Achilles, but his ankle can be brought in towards neutral, but he does have a block to motion, due to the ankle and abnormal ankle architecture causes him to have incomplete range of motion of the ankle joint   ROM: Poor ankle and hindfoot motion  Neuro Motor/Sensory: NL/NL, Vascular: NL foot/ankle pulses  Lymphatics: No extremity lymphedema, No calf swelling, no tenderness to calf muscles.     His right calf and right foot are of smaller size, compared to the left, which is classic for and typical for patients with a clubfoot deformities. Or residual fractional clubfoot deformities. CHART REVIEW     There is no problem list on file for this patient. Adonis Neumann has been experiencing pain and discomfort confirmed as outlined in the pain assessment outlined below.       Pain Assessment  8/17/2020   Location of Pain Ankle   Location Modifiers Right   Severity of Pain 0   Quality of Pain -   Quality of Pain Comment -   Duration of Pain -   Frequency of Pain Intermittent   Aggravating Factors Walking;Standing   Aggravating Factors Comment -   Limiting Behavior Yes   Relieving Factors Rest   Relieving Factors Comment -   Result of Injury No        Adonis Neumann  has no past medical history on file. Patients is employed at:         History reviewed. No pertinent past medical history. History reviewed. No pertinent surgical history. Current Outpatient Medications   Medication Sig    meloxicam (Mobic) 15 mg tablet Take 1 Tab by mouth daily (with breakfast).  famotidine (PEPCID) 40 mg tablet Take 1 Tab by mouth daily. No current facility-administered medications for this visit. Allergies   Allergen Reactions    Amoxicillin Hives     Social History     Occupational History    Not on file   Tobacco Use    Smoking status: Light Tobacco Smoker    Smokeless tobacco: Never Used   Substance and Sexual Activity    Alcohol use: Not on file    Drug use: Not on file    Sexual activity: Not on file     History reviewed. No pertinent family history. THE  Art Schmidt MD 8/17/2020 . DIAGNOSTIC IMAGING  LAB DATA      No results found for: HBA1C, HGBE8, BHW5CRXD //   Lab Results   Component Value Date/Time    Glucose 78 06/12/2020 10:29 AM        No results found for: GRH4UMTH, WLX5QXVF      No results found for: VITD3, XQVID2, XQVID3, XQVID, VD3RIA, [id number]      REVIEW OF SYSTEMS : 8/17/2020  ALL BELOW ARE Negative except : SEE HPI     CONSTITUTIONAL: No weight loss  PSYCHOLOGICAL : No Feelings of anxiety, depression, agitation  EYES: No blurred vision and no eye discharge. NO eye pain, double vision  ENT: No nasal discharge. No ear pain. CARDIOVASCULAR: No chest pain and no diaphoresis. RESPIRATORY: No cough, no hemoptysis. GI: No vomiting, no diarrhea   : No urinary frequency and no dysuria. MUSCULOSKELETAL: see HPI  SKIN: No rashes. NEURO:  No dizziness,weakness, headaches// No visual changes or confusion, or seizures,   ENDOCRINE: No polyphagia and no polydipsia. HEMATOLOGY: No bleeding tendencies. DIAGNOSTIC IMAGING      Please see above section of this report.     I have personally reviewed the results of the above study. The interpretation of this study is my professional opinion.       Lexis Zaragoza MD  8/17/2020  7:53 AM